# Patient Record
(demographics unavailable — no encounter records)

---

## 2024-10-22 NOTE — PHYSICAL EXAM
[Appropriately responsive] : appropriately responsive [Alert] : alert [No Acute Distress] : no acute distress [Soft] : soft [Non-tender] : non-tender [Non-distended] : non-distended [No HSM] : No HSM [No Lesions] : no lesions [No Mass] : no mass [Oriented x3] : oriented x3 [Labia Majora] : normal [Normal] : normal [Uterine Adnexae] : normal [FreeTextEntry6] : no masses, lymph nodes, tenderness, nipple discharge or skin changes [FreeTextEntry2] : warts along right and left labia minora

## 2024-10-22 NOTE — HISTORY OF PRESENT ILLNESS
[Y] : Patient is sexually active [N] : Patient denies prior pregnancies [Currently Active] : currently active [Men] : men [FreeTextEntry1] : 21 y/o presented for annual exam  pt states she has been feeling bumps on labia for 2 wks, no itching no discharge no odor  never had vaginal penetration   [PapSmeardate] : never. [LMPDate] : 9/23/24 [PGHxTotal] : 0 [TextBox_9] : 12 [TextBox_13] : 28 [TextBox_15] : 5

## 2024-11-22 NOTE — PHYSICAL EXAM
[Labia Majora] : normal [Normal] : normal [Uterine Adnexae] : normal [FreeTextEntry2] : warts along right and left labia minora decreased in size

## 2024-11-22 NOTE — HISTORY OF PRESENT ILLNESS
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

## 2024-12-30 NOTE — HISTORY OF PRESENT ILLNESS
[Y] : Patient is sexually active [N] : Patient denies prior pregnancies [Menarche Age: ____] : age at menarche was [unfilled] [No] : Patient does not have concerns regarding sex [Currently Active] : currently active [PapSmeardate] : 10/22/24 [TextBox_31] : wnl [GonorrheaDate] : 10/22/24 [TextBox_63] : neg [ChlamydiaDate] : 10/22/24 [TextBox_68] : neg [LMPDate] : 12/18/24 [PGHxTotal] : 0 [FreeTextEntry1] : 12/18/24

## 2024-12-30 NOTE — PROCEDURE
[Vulvar Biopsy] : Vulvar Biopsy [Local Anesthesia] : local anesthesia [Sent to Pathology] : placed in buffered formalin and sent for pathology [Lisa's] : Lisa's solution [Tolerated Well] : the patient tolerated the procedure well [No Complications] : there were no complications [de-identified] : Labia minora / introitus wart like mucosal protrusions [de-identified] : scissors

## 2025-01-14 NOTE — REASON FOR VISIT
[Follow-Up] : a follow-up evaluation of [FreeTextEntry2] : Pt is here for a pollack removal with TCA treatment.

## 2025-03-21 NOTE — PHYSICAL EXAM
[Labia Majora] : normal [Labia Minora] : normal [Normal] : normal [FreeTextEntry2] : resolved condyloma

## 2025-03-21 NOTE — HISTORY OF PRESENT ILLNESS
[Y] : Patient is sexually active [N] : Patient denies prior pregnancies [Currently Active] : currently active [Men] : men [FreeTextEntry1] : pt is s/p condyloma resection and TCA application after lidocaine to introitus denies itching or burning  no bleeding  pt inspected lesions of condyloma resolved   [PapSmeardate] : 10/22/24 [GonorrheaDate] : 10/22/24 [ChlamydiaDate] : 10/22/24 [LMPDate] : 3/14/25 [PGHxTotal] : 0 [TextBox_9] : 12 [TextBox_13] : 28 [TextBox_15] : 5